# Patient Record
Sex: FEMALE | Race: BLACK OR AFRICAN AMERICAN | URBAN - METROPOLITAN AREA
[De-identification: names, ages, dates, MRNs, and addresses within clinical notes are randomized per-mention and may not be internally consistent; named-entity substitution may affect disease eponyms.]

---

## 2019-02-11 ENCOUNTER — APPOINTMENT (RX ONLY)
Dept: URBAN - METROPOLITAN AREA CLINIC 184 | Facility: CLINIC | Age: 84
Setting detail: DERMATOLOGY
End: 2019-02-11

## 2019-02-11 DIAGNOSIS — L21.8 OTHER SEBORRHEIC DERMATITIS: ICD-10-CM

## 2019-02-11 DIAGNOSIS — D49.2 NEOPLASM OF UNSPECIFIED BEHAVIOR OF BONE, SOFT TISSUE, AND SKIN: ICD-10-CM

## 2019-02-11 PROBLEM — L29.8 OTHER PRURITUS: Status: ACTIVE | Noted: 2019-02-11

## 2019-02-11 PROBLEM — I10 ESSENTIAL (PRIMARY) HYPERTENSION: Status: ACTIVE | Noted: 2019-02-11

## 2019-02-11 PROBLEM — H91.90 UNSPECIFIED HEARING LOSS, UNSPECIFIED EAR: Status: ACTIVE | Noted: 2019-02-11

## 2019-02-11 PROBLEM — E78.5 HYPERLIPIDEMIA, UNSPECIFIED: Status: ACTIVE | Noted: 2019-02-11

## 2019-02-11 PROCEDURE — 99203 OFFICE O/P NEW LOW 30 MIN: CPT

## 2019-02-11 PROCEDURE — ? ADDITIONAL NOTES

## 2019-02-11 PROCEDURE — ? COUNSELING

## 2019-02-11 PROCEDURE — ? PRESCRIPTION

## 2019-02-11 RX ORDER — BETAMETHASONE VALERATE 1 MG/G
CREAM TOPICAL
Qty: 1 | Refills: 3 | Status: ERX | COMMUNITY
Start: 2019-02-11

## 2019-02-11 RX ORDER — SELENIUM SULFIDE 22.5 MG/ML
SHAMPOO TOPICAL
Qty: 1 | Refills: 3 | Status: ERX | COMMUNITY
Start: 2019-02-11

## 2019-02-11 RX ADMIN — BETAMETHASONE VALERATE: 1 CREAM TOPICAL at 18:58

## 2019-02-11 RX ADMIN — SELENIUM SULFIDE: 22.5 SHAMPOO TOPICAL at 19:01

## 2019-02-11 NOTE — PROCEDURE: ADDITIONAL NOTES
Additional Notes: Patient is to come back and get a shave biopsy, and with list of medications
Detail Level: Simple

## 2019-07-16 ENCOUNTER — RX ONLY (OUTPATIENT)
Age: 84
Setting detail: RX ONLY
End: 2019-07-16

## 2019-07-16 RX ORDER — BETAMETHASONE VALERATE 1 MG/G
CREAM TOPICAL
Qty: 1 | Refills: 3 | Status: ERX